# Patient Record
Sex: FEMALE | Race: BLACK OR AFRICAN AMERICAN | NOT HISPANIC OR LATINO | Employment: PART TIME | URBAN - METROPOLITAN AREA
[De-identification: names, ages, dates, MRNs, and addresses within clinical notes are randomized per-mention and may not be internally consistent; named-entity substitution may affect disease eponyms.]

---

## 2017-02-02 ENCOUNTER — TELEPHONE (OUTPATIENT)
Dept: NEUROLOGY | Facility: HOSPITAL | Age: 37
End: 2017-02-02

## 2017-02-02 NOTE — TELEPHONE ENCOUNTER
----- Message from Kasia Gomez sent at 2/2/2017 11:30 AM CST -----  Contact: NEERU  SIENA Krause with [facility] called in regards to patient  needing  a prescription for percocet. Nelida's call back number is [phone].

## 2017-02-06 ENCOUNTER — TELEPHONE (OUTPATIENT)
Dept: NEUROLOGY | Facility: HOSPITAL | Age: 37
End: 2017-02-06

## 2017-02-06 NOTE — TELEPHONE ENCOUNTER
----- Message from Juliette Almanzar sent at 2/6/2017 11:26 AM CST -----  Contact: Patient  KARTHIKEYAN- patient called regarding her that her stitches were discharging. Patient can be reached at [phone]

## 2017-02-06 NOTE — TELEPHONE ENCOUNTER
Returned pts call. Pt states she had hernia repair at [facility] last Saturday. Pt states that she thinks she layed on her stitches last night and she woke up and she noticed blood and clear fluid. States bleeding has stopped, denies redness, swelling drainage. Advised pt to keep an eye on wound and if any of the above develop to call back and proceed to ER. Pt verbalizes understanding. Hospital follow up appt next Monday.

## 2017-02-10 ENCOUNTER — TELEPHONE (OUTPATIENT)
Dept: NEUROLOGY | Facility: HOSPITAL | Age: 37
End: 2017-02-10

## 2017-02-10 NOTE — TELEPHONE ENCOUNTER
----- Message from Kasia Gomez sent at 2/10/2017  8:56 AM CST -----  Contact: patient  KARTHIKEYAN- patients mother, Jeanna Goodson, called in regards to Family Leave paperwork. Jeanna's office, [employer] has not received the paperwork back. Jeanna can be reached at [phone].

## 2017-02-10 NOTE — TELEPHONE ENCOUNTER
Returned pts mothers call to inform that since pt had surgery at [facility], I do not have access to their records, and some additional information is needed in order to fill out [facility] paperwork. Mother did not answer, LVM to return call to give additional information. Awaiting call back.

## 2017-02-13 ENCOUNTER — OFFICE VISIT (OUTPATIENT)
Dept: NEUROLOGY | Facility: HOSPITAL | Age: 37
End: 2017-02-13
Attending: SURGERY
Payer: COMMERCIAL

## 2017-02-13 VITALS
HEART RATE: 75 BPM | SYSTOLIC BLOOD PRESSURE: 108 MMHG | WEIGHT: 293 LBS | HEIGHT: 61 IN | BODY MASS INDEX: 55.32 KG/M2 | DIASTOLIC BLOOD PRESSURE: 73 MMHG | TEMPERATURE: 99 F

## 2017-02-13 DIAGNOSIS — Z09 POSTOP CHECK: Primary | ICD-10-CM

## 2017-02-13 PROCEDURE — 99214 OFFICE O/P EST MOD 30 MIN: CPT | Performed by: SURGERY

## 2017-02-13 RX ORDER — CEPHALEXIN 500 MG/1
CAPSULE ORAL
Refills: 0 | COMMUNITY
Start: 2017-02-07 | End: 2017-02-23

## 2017-02-13 RX ORDER — SULFAMETHOXAZOLE AND TRIMETHOPRIM 800; 160 MG/1; MG/1
1 TABLET ORAL 2 TIMES DAILY
Refills: 0 | COMMUNITY
Start: 2017-02-07 | End: 2017-02-23

## 2017-02-13 RX ORDER — OXYCODONE AND ACETAMINOPHEN 5; 325 MG/1; MG/1
TABLET ORAL
Refills: 0 | COMMUNITY
Start: 2017-02-02 | End: 2017-02-13

## 2017-02-13 RX ORDER — HYDROCODONE BITARTRATE AND ACETAMINOPHEN 5; 325 MG/1; MG/1
TABLET ORAL
Refills: 0 | COMMUNITY
Start: 2017-02-07 | End: 2017-05-01

## 2017-02-13 RX ORDER — ALBUTEROL SULFATE 90 UG/1
2 POWDER, METERED RESPIRATORY (INHALATION) EVERY 4 HOURS PRN
Refills: 1 | COMMUNITY
Start: 2017-02-02 | End: 2017-05-01

## 2017-02-13 NOTE — LETTER
February 13, 2017      [facility] [address] [place] [address]  Phone: [phone]  Fax: [phone]       Patient: Vanita Morillo   YOB: 1980  Date of Visit: 02/13/2017    To Whom It May Concern:    Vanita was at [facility] on 02/13/2017. She may return to work/school on 3/13/2017  with no restrictions. If you have any questions or concerns, or if I can be of further assistance, please do not hesitate to contact me.    Sincerely,        Hoda Bell LPN

## 2017-02-15 ENCOUNTER — TELEPHONE (OUTPATIENT)
Dept: NEUROLOGY | Facility: HOSPITAL | Age: 37
End: 2017-02-15

## 2017-02-15 NOTE — TELEPHONE ENCOUNTER
----- Message from Lida Arenas sent at 2/15/2017 12:17 PM CST -----  KARTHIKEYAN- Patient called and stated if you can get the papers for her work and fax it to [phone] with her discharge notes. If any questions please call patient back at [phone]

## 2017-02-15 NOTE — TELEPHONE ENCOUNTER
Returned pts call. Informed pt that I have faxed leave paperwork along with office note to [facility], but I did not have access to discharge summary due to surgery being performed at . Advised pt to call medical records at [facility]. Pt verbalizes understanding.

## 2017-02-21 ENCOUNTER — TELEPHONE (OUTPATIENT)
Dept: NEUROLOGY | Facility: HOSPITAL | Age: 37
End: 2017-02-21

## 2017-02-21 NOTE — TELEPHONE ENCOUNTER
----- Message from Kasia Gomez sent at 2/21/2017  9:12 AM CST -----  Contact: Patient  KARTHIKEYAN- Patient called because she is concerned that her surgery site is a little larger and draining. Patient also states she finished all of her antibiotics. Patient would like a call back at [phone].

## 2017-02-21 NOTE — TELEPHONE ENCOUNTER
Returned pts call. Pt states that she had a little hole from where staples came out and it has gotten bigger. Right below belly button, size of a irma. Copious drainage with a foul odor, yellow/red in color. Finished with antibiotics. Discussed with Dr. Irwin, who will see pt in clinic on Thusday. Pt verbalizes understanding.

## 2017-02-23 ENCOUNTER — OFFICE VISIT (OUTPATIENT)
Dept: NEUROLOGY | Facility: HOSPITAL | Age: 37
End: 2017-02-23
Attending: SURGERY
Payer: COMMERCIAL

## 2017-02-23 VITALS
WEIGHT: 293 LBS | DIASTOLIC BLOOD PRESSURE: 79 MMHG | SYSTOLIC BLOOD PRESSURE: 118 MMHG | TEMPERATURE: 99 F | HEIGHT: 61 IN | BODY MASS INDEX: 55.32 KG/M2 | HEART RATE: 83 BPM

## 2017-02-23 DIAGNOSIS — Z09 POSTOP CHECK: Primary | ICD-10-CM

## 2017-02-23 PROCEDURE — 99213 OFFICE O/P EST LOW 20 MIN: CPT | Performed by: SURGERY

## 2017-02-23 RX ORDER — ACETAZOLAMIDE 500 MG/1
500 CAPSULE, EXTENDED RELEASE ORAL 2 TIMES DAILY
COMMUNITY
End: 2017-05-01

## 2017-02-23 NOTE — MR AVS SNAPSHOT
[facility]  [address] [place] [address]  Phone: [phone]  Fax: [phone]                  Vanita Morillo   2017 11:00 AM   Office Visit    Description:  Female : 1980   Provider:  Catalina Irwin MD   Department:  [facility]           Reason for Visit     Post-op Evaluation           Diagnoses this Visit        Comments    Postop check    -  Primary            To Do List           Future Appointments        Provider Department Dept Phone    3/6/2017 9:45 AM Catalina Irwin MD [facility] [phone]      Goals (5 Years of Data)     None      Follow-Up and Disposition     Return in about 2 weeks (around 3/9/2017).    Follow-up and Disposition History      [facility]     [facility] Nurse Care Line -  Assistance  Registered nurses in the [facility] provide clinical advisement, health education, appointment booking, and other advisory services.  Call for this free service at [phone].             Medications           Message regarding Medications     Verify the changes and/or additions to your medication regime listed below are the same as discussed with your clinician today.  If any of these changes or additions are incorrect, please notify your healthcare provider.        STOP taking these medications     cephALEXin (KEFLEX) 500 MG capsule TAKE 1 CAPSULE BY MOUTH 4 TIMES A DAY FOR 10 DAYS    sulfamethoxazole-trimethoprim 800-160mg (BACTRIM DS) 800-160 mg Tab Take 1 tablet by mouth 2 (two) times daily.           Verify that the below list of medications is an accurate representation of the medications you are currently taking.  If none reported, the list may be blank. If incorrect, please contact your healthcare provider. Carry this list with you in case of emergency.           Current Medications     acetaZOLAMIDE (DIAMOX) 500 mg CpSR Take 500 mg by mouth 2 (two) times daily.     "hydrocodone-acetaminophen 5-325mg (NORCO) 5-325 mg per tablet TAKE 1 TABLET BY MOUTH EVERY 6 HOURS FOR 5 DAYS AS NEEDED FOR PAIN    PROAIR RESPICLICK 90 mcg/actuation AePB Inhale 2 puffs into the lungs every 4 (four) hours as needed.           Clinical Reference Information           Your Vitals Were     BP Pulse Temp Height Weight BMI    118/79 83 99.3 °F (37.4 °C) (Oral) 5' 1" (1.549 m) 140.2 kg (309 lb) 58.39 kg/m2      Blood Pressure          Most Recent Value    BP  118/79      Allergies as of 2/23/2017     No Known Allergies      Immunizations Administered on Date of Encounter - 2/23/2017     None      [facility] Sign-Up     Activating your [facility] account is as easy as 1-2-3!     1) Visit [email or web address], select Sign Up Now, enter this activation code and your date of birth, then select Next.  [email or web address]  Expires: 3/30/2017 10:56 AM      2) Create a username and password to use when you visit [facility] in the future and select a security question in case you lose your password and select Next.    3) Enter your e-mail address and click Sign Up!    Additional Information  If you have questions, please e-mail [email or web address] or call [phone] to talk to our [facility] staff. Remember, [facility] is NOT to be used for urgent needs. For medical emergencies, dial 911.         Instructions    Keep appointment in March   RX given for Norco        Language Assistance Services     ATTENTION: Language assistance services are available, free of charge. Please call [phone].      ATENCIÓN: Si habla español, tiene a amin disposición servicios gratuitos de asistencia lingüística. Llame al [phone].     CHÚ Ý: N?u b?n nói Ti?ng Vi?t, có các d?ch v? h? tr? ngôn ng? mi?n phí dành cho b?n. G?i s? [phone].         [facility] complies with applicable Federal civil rights laws and does not discriminate on the basis of race, color, national origin, age, disability, or sex.        "

## 2017-02-23 NOTE — PROGRESS NOTES
S/p incisional hernia  Incarcerated repair    Doing okj  Still with dc from wound      Eating , normal BM    abd soft    Wound looks good   Minimal dc from wound    Continue present care    Will see hert in 2 weeks

## 2017-03-06 ENCOUNTER — LAB VISIT (OUTPATIENT)
Dept: LAB | Facility: HOSPITAL | Age: 37
End: 2017-03-06
Attending: SURGERY
Payer: COMMERCIAL

## 2017-03-06 ENCOUNTER — OFFICE VISIT (OUTPATIENT)
Dept: NEUROLOGY | Facility: HOSPITAL | Age: 37
End: 2017-03-06
Attending: SURGERY
Payer: COMMERCIAL

## 2017-03-06 VITALS
TEMPERATURE: 100 F | SYSTOLIC BLOOD PRESSURE: 112 MMHG | HEIGHT: 61 IN | RESPIRATION RATE: 18 BRPM | DIASTOLIC BLOOD PRESSURE: 76 MMHG | BODY MASS INDEX: 55.32 KG/M2 | WEIGHT: 293 LBS | HEART RATE: 76 BPM

## 2017-03-06 DIAGNOSIS — R30.0 DYSURIA: ICD-10-CM

## 2017-03-06 DIAGNOSIS — Z09 POSTOP CHECK: Primary | ICD-10-CM

## 2017-03-06 DIAGNOSIS — R30.0 DYSURIA: Primary | ICD-10-CM

## 2017-03-06 LAB
BACTERIA #/AREA URNS HPF: NORMAL /HPF
BILIRUB UR QL STRIP: NEGATIVE
CLARITY UR: ABNORMAL
COLOR UR: YELLOW
GLUCOSE UR QL STRIP: NEGATIVE
HGB UR QL STRIP: ABNORMAL
KETONES UR QL STRIP: NEGATIVE
LEUKOCYTE ESTERASE UR QL STRIP: ABNORMAL
MICROSCOPIC COMMENT: NORMAL
NITRITE UR QL STRIP: NEGATIVE
PH UR STRIP: 6 [PH] (ref 5–8)
PROT UR QL STRIP: NEGATIVE
RBC #/AREA URNS HPF: 3 /HPF (ref 0–4)
SP GR UR STRIP: 1.02 (ref 1–1.03)
SQUAMOUS #/AREA URNS HPF: 15 /HPF
URN SPEC COLLECT METH UR: ABNORMAL
UROBILINOGEN UR STRIP-ACNC: NEGATIVE EU/DL
WBC #/AREA URNS HPF: 2 /HPF (ref 0–5)

## 2017-03-06 PROCEDURE — 99213 OFFICE O/P EST LOW 20 MIN: CPT | Performed by: SURGERY

## 2017-03-06 PROCEDURE — 81000 URINALYSIS NONAUTO W/SCOPE: CPT

## 2017-03-06 PROCEDURE — 87086 URINE CULTURE/COLONY COUNT: CPT

## 2017-03-06 NOTE — PATIENT INSTRUCTIONS
Return to clinic in 8 weeks   Amber to call to consult   We will contact you to schedule EGD with Dr. Doty   Go to 1st floor outpatient diagnostic center to get urinalysis

## 2017-03-06 NOTE — MR AVS SNAPSHOT
[facility]  [address] [place] [address]  Phone: [phone]  Fax: [phone]                  Vanita Morillo   3/6/2017 9:45 AM   Office Visit    Description:  Female : 1980   Provider:  Catalina Irwin MD   Department:  [facility]           Reason for Visit     Hospital Follow Up           Diagnoses this Visit        Comments    Postop check    -  Primary            To Do List           Future Appointments        Provider Department Dept Phone    2017 8:30 AM Catalina Irwin MD [facility] [phone]      Goals (5 Years of Data)     None      Follow-Up and Disposition     Return in about 8 weeks (around 2017).    Follow-up and Disposition History      [facility]     [facility] Nurse Care Line -  Assistance  Registered nurses in the [facility] provide clinical advisement, health education, appointment booking, and other advisory services.  Call for this free service at [phone].             Medications           Message regarding Medications     Verify the changes and/or additions to your medication regime listed below are the same as discussed with your clinician today.  If any of these changes or additions are incorrect, please notify your healthcare provider.             Verify that the below list of medications is an accurate representation of the medications you are currently taking.  If none reported, the list may be blank. If incorrect, please contact your healthcare provider. Carry this list with you in case of emergency.           Current Medications     acetaZOLAMIDE (DIAMOX) 500 mg CpSR Take 500 mg by mouth 2 (two) times daily.    hydrocodone-acetaminophen 5-325mg (NORCO) 5-325 mg per tablet TAKE 1 TABLET BY MOUTH EVERY 6 HOURS FOR 5 DAYS AS NEEDED FOR PAIN    PROAIR RESPICLICK 90 mcg/actuation AePB Inhale 2 puffs into the lungs every 4 (four) hours as needed.          "  Clinical Reference Information           Your Vitals Were     BP Pulse Temp Resp Height Weight    112/76 76 100 °F (37.8 °C) 18 5' 1" (1.549 m) 139.6 kg (307 lb 12.8 oz)    Last Period BMI             03/02/2017 58.16 kg/m2         Blood Pressure          Most Recent Value    BP  112/76      Allergies as of 3/6/2017     No Known Allergies      Immunizations Administered on Date of Encounter - 3/6/2017     None      Orders Placed During Today's Visit      Normal Orders This Visit    CULTURE, URINE       [facility] Sign-Up     Activating your [facility] account is as easy as 1-2-3!     1) Visit [email or web address], select Sign Up Now, enter this activation code and your date of birth, then select Next.  [email or web address]  Expires: 3/30/2017 10:56 AM      2) Create a username and password to use when you visit [facility] in the future and select a security question in case you lose your password and select Next.    3) Enter your e-mail address and click Sign Up!    Additional Information  If you have questions, please e-mail [email or web address] or call [phone] to talk to our [facility] staff. Remember, [facility] is NOT to be used for urgent needs. For medical emergencies, dial 911.         Instructions    Return to clinic in 8 weeks   [facility] to call to consult   We will contact you to schedule EGD with Dr. Doty   Go to 1st floor outpatient diagnostic center to get urinalysis             Language Assistance Services     ATTENTION: Language assistance services are available, free of charge. Please call [phone].      ATENCIÓN: Si habla español, tiene a amin disposición servicios gratuitos de asistencia lingüística. Llame al [phone].     CHÚ Ý: N?u b?n nói Ti?ng Vi?t, có các d?ch v? h? tr? ngôn ng? mi?n phí dành cho b?n. G?i s? [phone].         [facility] complies with applicable Federal civil rights laws and does not discriminate on the basis of race, color, national origin, age, " disability, or sex.

## 2017-03-06 NOTE — LETTER
March 6, 2017      [facility] [address] [place] [address]  Phone: [phone]  Fax: [phone]       Patient: Vanita Morillo   YOB: 1980  Date of Visit: 03/06/2017    To Whom It May Concern:    Vanita was at [facility] on 3/6/2017. She may return to work/school on 3/20/2017 with no restrictions. If you have any questions or concerns, or if I can be of further assistance, please do not hesitate to contact me.    Sincerely,        Hoda Bell LPN

## 2017-03-06 NOTE — PROGRESS NOTES
S/p incisional hernia    Doing well          Wound looks good minimnal discharge heal;ing well    Continue present care    dietitician to talk to her    If temp elevated will call tjhis place    Will check urine culture    Had seen ophthalmologist, may need surgery    F/u 8 weeks    Will arrange EGD

## 2017-03-08 LAB
BACTERIA UR CULT: NORMAL
BACTERIA UR CULT: NORMAL

## 2017-03-09 ENCOUNTER — TELEPHONE (OUTPATIENT)
Dept: NEUROLOGY | Facility: HOSPITAL | Age: 37
End: 2017-03-09

## 2017-03-09 DIAGNOSIS — K21.9 GASTROESOPHAGEAL REFLUX DISEASE, ESOPHAGITIS PRESENCE NOT SPECIFIED: Primary | ICD-10-CM

## 2017-03-09 NOTE — TELEPHONE ENCOUNTER
You are scheduled for an EGD on 4/6/2017    You should eat light meals the day before the procedure and nothing to eat or drink after midnight the night before your procedure.    You will need to be at the 1st floor admission desk at the hospital at the time given to you by the endoscopy department. They will call to give you an arrival time the day before the procedure.

## 2017-03-09 NOTE — TELEPHONE ENCOUNTER
Called pt to schedule EGD 4/6/2017. Verbally reviewed instructions. Will also mail a copy to home address. Pt verbalizes understanding.

## 2017-03-14 ENCOUNTER — TELEPHONE (OUTPATIENT)
Dept: NEUROLOGY | Facility: HOSPITAL | Age: 37
End: 2017-03-14

## 2017-03-14 NOTE — TELEPHONE ENCOUNTER
----- Message from Helen Jones sent at 3/13/2017  4:18 PM CDT -----  Try this number [phone].  Also, see is she even needs authorization for an outpatient surgery.  ----- Message -----     From: Juliette Almanzar     Sent: 3/13/2017   4:07 PM       To: Helen Jones    Bev, I am having problems getting authorization for this procedure. The insurance prompts will not lead me to a human...and the procedure is not listed..the closet procedure they have is a colonoscopy...Help ...  Juliette   ----- Message -----     From: Hoda Bell LPN     Sent: 3/9/2017  10:41 AM       To: Juliette Segovia,   Can we get auth for this pt for 4/6?   DX: K21.9  CPT: 52375    Thanks!  Hoda

## 2017-04-06 ENCOUNTER — ANESTHESIA EVENT (OUTPATIENT)
Dept: ENDOSCOPY | Facility: HOSPITAL | Age: 37
End: 2017-04-06
Payer: COMMERCIAL

## 2017-04-06 ENCOUNTER — SURGERY (OUTPATIENT)
Age: 37
End: 2017-04-06

## 2017-04-06 ENCOUNTER — HOSPITAL ENCOUNTER (OUTPATIENT)
Facility: HOSPITAL | Age: 37
Discharge: HOME | End: 2017-04-06
Attending: INTERNAL MEDICINE | Admitting: INTERNAL MEDICINE
Payer: COMMERCIAL

## 2017-04-06 ENCOUNTER — ANESTHESIA (OUTPATIENT)
Dept: ENDOSCOPY | Facility: HOSPITAL | Age: 37
End: 2017-04-06
Payer: COMMERCIAL

## 2017-04-06 DIAGNOSIS — K29.70 GASTRITIS, PRESENCE OF BLEEDING UNSPECIFIED, UNSPECIFIED CHRONICITY, UNSPECIFIED GASTRITIS TYPE: ICD-10-CM

## 2017-04-06 DIAGNOSIS — R10.9 ABDOMINAL PAIN, UNSPECIFIED LOCATION: Primary | ICD-10-CM

## 2017-04-06 DIAGNOSIS — R10.9 ABDOMINAL PAIN: ICD-10-CM

## 2017-04-06 LAB
B-HCG UR QL: NEGATIVE
CTP QC/QA: YES

## 2017-04-06 PROCEDURE — 81025 URINE PREGNANCY TEST: CPT | Performed by: INTERNAL MEDICINE

## 2017-04-06 PROCEDURE — 43239 EGD BIOPSY SINGLE/MULTIPLE: CPT | Performed by: INTERNAL MEDICINE

## 2017-04-06 PROCEDURE — 37000009 HC ANESTHESIA EA ADD 15 MINS: Performed by: INTERNAL MEDICINE

## 2017-04-06 PROCEDURE — 88305 TISSUE EXAM BY PATHOLOGIST: CPT | Performed by: PATHOLOGY

## 2017-04-06 PROCEDURE — 88305 TISSUE EXAM BY PATHOLOGIST: CPT | Mod: 26,,, | Performed by: PATHOLOGY

## 2017-04-06 PROCEDURE — 25000003 PHARM REV CODE 250: Performed by: NURSE ANESTHETIST, CERTIFIED REGISTERED

## 2017-04-06 PROCEDURE — 25000003 PHARM REV CODE 250: Performed by: INTERNAL MEDICINE

## 2017-04-06 PROCEDURE — 63600175 PHARM REV CODE 636 W HCPCS: Performed by: NURSE ANESTHETIST, CERTIFIED REGISTERED

## 2017-04-06 PROCEDURE — 37000008 HC ANESTHESIA 1ST 15 MINUTES: Performed by: INTERNAL MEDICINE

## 2017-04-06 RX ORDER — PROPOFOL 10 MG/ML
VIAL (ML) INTRAVENOUS CONTINUOUS PRN
Status: DISCONTINUED | OUTPATIENT
Start: 2017-04-06 | End: 2017-04-06

## 2017-04-06 RX ORDER — PROPOFOL 10 MG/ML
VIAL (ML) INTRAVENOUS
Status: DISCONTINUED | OUTPATIENT
Start: 2017-04-06 | End: 2017-04-06

## 2017-04-06 RX ORDER — SODIUM CHLORIDE 9 MG/ML
INJECTION, SOLUTION INTRAVENOUS CONTINUOUS
Status: DISCONTINUED | OUTPATIENT
Start: 2017-04-06 | End: 2017-04-06 | Stop reason: HOSPADM

## 2017-04-06 RX ORDER — LIDOCAINE HCL/PF 100 MG/5ML
SYRINGE (ML) INTRAVENOUS
Status: DISCONTINUED | OUTPATIENT
Start: 2017-04-06 | End: 2017-04-06

## 2017-04-06 RX ADMIN — SODIUM CHLORIDE: 0.9 INJECTION, SOLUTION INTRAVENOUS at 11:04

## 2017-04-06 RX ADMIN — PROPOFOL 50 MG: 10 INJECTION, EMULSION INTRAVENOUS at 12:04

## 2017-04-06 RX ADMIN — LIDOCAINE HYDROCHLORIDE 100 MG: 20 INJECTION, SOLUTION INTRAVENOUS at 12:04

## 2017-04-06 RX ADMIN — TOPICAL ANESTHETIC 1 EACH: 200 SPRAY DENTAL; PERIODONTAL at 12:04

## 2017-04-06 RX ADMIN — PROPOFOL 150 MCG/KG/MIN: 10 INJECTION, EMULSION INTRAVENOUS at 12:04

## 2017-04-06 NOTE — ANESTHESIA PREPROCEDURE EVALUATION
04/06/2017  Vanita Morillo is a 37 y.o., female, morbidly obese, with gerd here for EGD for esophagitis.    [facility] Anesthesia Evaluation    I have reviewed the Patient Summary Reports.     I have reviewed the Medications.     Review of Systems  Anesthesia Hx:  History of prior surgery of interest to airway management or planning:  Denies Personal Hx of Anesthesia complications.   Social:  Non-Smoker, No Alcohol Use    Cardiovascular:  Cardiovascular Normal     Hepatic/GI:   GERD, poorly controlled    Neurological:  Neurology Normal    Endocrine:  Endocrine Normal        Physical Exam  General:  Well nourished, Morbid Obesity    Airway/Jaw/Neck:  Airway Findings: Mouth Opening: Normal Tongue: Normal  General Airway Assessment: Adult  Mallampati: II  TM Distance: Normal, at least 6 cm  Jaw/Neck Findings:     Neck ROM: Normal ROM      Dental:  Dental Findings: In tact   Chest/Lungs:  Chest/Lungs Clear    Heart/Vascular:  Heart Findings: Normal       Mental Status:  Mental Status Findings:  Cooperative, Alert and Oriented         Anesthesia Plan  Type of Anesthesia, risks & benefits discussed:  Anesthesia Type:  MAC  Patient's Preference: MAC  Intra-op Monitoring Plan:   Intra-op Monitoring Plan Comments:   Post Op Pain Control Plan:   Post Op Pain Control Plan Comments:   Induction:   IV  Beta Blocker:         Informed Consent: Patient understands risks and agrees with Anesthesia plan.  Questions answered. Anesthesia consent signed with patient.  ASA Score: 3     Day of Surgery Review of History & Physical:    H&P update referred to the provider.         Ready For Surgery From Anesthesia Perspective.

## 2017-04-06 NOTE — IP AVS SNAPSHOT
[facility]  [address]  [place] [address]  Phone: [phone]           Patient Discharge Instructions   Our goal is to set you up for success. This packet includes information on your condition, medications, and your home care.  It will help you care for yourself to prevent having to return to the hospital.     Please ask your nurse if you have any questions.      There are many details to remember when preparing to leave the hospital. Here is what you will need to do:    1. Take your medicine. If you are prescribed medications, review your Medication List on the following pages. You may have new medications to  at the pharmacy and others that you'll need to stop taking. Review the instructions for how and when to take your medications. Talk with your doctor or nurses if you are unsure of what to do.     2. Go to your follow-up appointments. Specific follow-up information is listed in the following pages. Your may be contacted by a nurse or clinical provider about future appointments. Be sure we have all of the phone numbers to reach you. Please contact your provider's office if you are unable to make an appointment.     3. Watch for warning signs. Your doctor or nurse will give you detailed warning signs to watch for and when to call for assistance. These instructions may also include educational information about your condition. If you experience any of warning signs to your health, call your doctor.               ** Verify the list of medication(s) below is accurate and up to date. Carry this with you in case of emergency. If your medications have changed, please notify your healthcare provider.             Medication List      CONTINUE taking these medications        Additional Info                      acetaZOLAMIDE 500 mg Cpsr   Commonly known as:  DIAMOX   Refills:  0   Dose:  500 mg    Instructions:  Take 500 mg by mouth 2 (two) times daily.     Begin Date    AM    Noon    PM     Bedtime       hydrocodone-acetaminophen 5-325mg 5-325 mg per tablet   Commonly known as:  NORCO   Refills:  0    Instructions:  TAKE 1 TABLET BY MOUTH EVERY 6 HOURS FOR 5 DAYS AS NEEDED FOR PAIN     Begin Date    AM    Noon    PM    Bedtime       PROAIR RESPICLICK 90 mcg/actuation Aepb   Refills:  1   Dose:  2 puff   Generic drug:  albuterol sulfate    Instructions:  Inhale 2 puffs into the lungs every 4 (four) hours as needed.     Begin Date    AM    Noon    PM    Bedtime                  Please bring to all follow up appointments:    1. A copy of your discharge instructions.  2. All medicines you are currently taking in their original bottles.  3. Identification and insurance card.    Please arrive 15 minutes ahead of scheduled appointment time.    Please call 24 hours in advance if you must reschedule your appointment and/or time.        Your Scheduled Appointments     May 01, 2017  8:30 AM CDT   Established Patient Visit with Catalina Irwin MD   [facility] ([facility])    [address]  [place] [address]   [phone]              Your Future Surgeries/Procedures     Apr 06, 2017   Surgery with Oskar Doty MD   [facility] ([facility])    [address]  [place] [address]   [phone]                Discharge Instructions     Future Orders    Diet general     Questions:    Total calories:      Fat restriction, if any:      Protein restriction, if any:      Na restriction, if any:      Fluid restriction:      Additional restrictions:          Discharge Instructions       Post EGD Discharge Instruction    Vanita Morillo  4/6/2017  Oskar Doty MD    RESTRICTIONS ON ACTIVITY:    -DO NOT drive a car or operate machinery until the day after procedure.  -Following Day: Return to full activities including work.  -Diet: Eat and drink normally unless instructed otherwise.    TREATMENT FOR COMMON SIDE EFFECTS:  *Sore  "Throat - treat with throat lozenges, gargle with warm salt water.  *Mild abdominal pain & bloating- rest and take liquids only.    SYMPTOMS TO WATCH FOR AND REPORT TO YOUR PHYSICIAN:  1. Chills or fever occurring 24 hours after procedure.  2. Pain in chest.  3. SEVERE abdominal pain or bloating.  4. Rectal bleeding which could be maroon or black.    If you have any questions or problems, please call your Physician:    Oskar Doty MD Phone: ***    Lab Results: [phone]    If a complication or emergency situation arises and you are unable to reach your Physician - GO TO THE EMERGENCY ROOM.        Admission Information     Date & Time Provider Department CSN    4/6/2017 10:49 AM Oskar Doty MD [facility] [id number]      Care Providers     Provider Role Specialty Primary office phone    Oskar oDty MD Attending Provider Gastroenterology [phone]    Oskar Doty MD Surgeon  Gastroenterology [phone]      Your Vitals Were     BP Pulse Temp Resp Height Weight    118/79 57 98.2 °F (36.8 °C) 16 5' 1" (1.549 m) 129.3 kg (285 lb)    Last Period SpO2 BMI          03/06/2017 100% 53.85 kg/m2        Recent Lab Values     No lab values to display.      Pending Labs     Order Current Status    Specimen to Pathology - Surgery Collected (04/06/17 1242)      Allergies as of 4/6/2017     No Known Allergies      [facility]     [facility] Nurse Care Line - 24/7 Assistance  Unless otherwise directed by your provider, please contact [facility], our nurse care line that is available for 24/7 assistance.     Registered nurses in the [facility] provide clinical advisement, health education, appointment booking, and other advisory services.  Call for this free service at [phone].        Advance Directives     An advance directive is a document which, in the event you are no longer able to make decisions for yourself, tells your healthcare team what kind of " treatment you do or do not want to receive, or who you would like to make those decisions for you.  If you do not currently have an advance directive, [facility] encourages you to create one.  For more information call:  [phone]), [phone] ([phone]),  or log on to [email or web address].        Language Assistance Services     ATTENTION: Language assistance services are available, free of charge. Please call [phone].      ATENCIÓN: Si habla espjaz, tiene a amin disposición servicios gratuitos de asistencia lingüística. Llame al [phone].     CHÚ Ý: N?u b?n nói Ti?ng Vi?t, có các d?ch v? h? tr? ngôn ng? mi?n phí dành cho b?n. G?i s? [phone].        [facility] Sign-Up     Activating your [facility] account is as easy as 1-2-3!     1) Visit [email or web address], select Sign Up Now, enter this activation code and your date of birth, then select Next.  [email or web address]  Expires: 5/21/2017  1:19 PM      2) Create a username and password to use when you visit [facility] in the future and select a security question in case you lose your password and select Next.    3) Enter your e-mail address and click Sign Up!    Additional Information  If you have questions, please e-mail [email or web address] or call [phone] to talk to our [facility] staff. Remember, [facility] is NOT to be used for urgent needs. For medical emergencies, dial 911.          [facility] complies with applicable Federal civil rights laws and does not discriminate on the basis of race, color, national origin, age, disability, or sex.

## 2017-04-06 NOTE — DISCHARGE INSTRUCTIONS
Post EGD Discharge Instruction    Vanita Morillo  4/6/2017  Oskar Doty MD    RESTRICTIONS ON ACTIVITY:    -DO NOT drive a car or operate machinery until the day after procedure.  -Following Day: Return to full activities including work.  -Diet: Eat and drink normally unless instructed otherwise.    TREATMENT FOR COMMON SIDE EFFECTS:  *Sore Throat - treat with throat lozenges, gargle with warm salt water.  *Mild abdominal pain & bloating- rest and take liquids only.    SYMPTOMS TO WATCH FOR AND REPORT TO YOUR PHYSICIAN:  1. Chills or fever occurring 24 hours after procedure.  2. Pain in chest.  3. SEVERE abdominal pain or bloating.  4. Rectal bleeding which could be maroon or black.    If you have any questions or problems, please call your Physician:    Oskar Doty MD Phone: ***    Lab Results: [phone]    If a complication or emergency situation arises and you are unable to reach your Physician - GO TO THE EMERGENCY ROOM.

## 2017-04-06 NOTE — OR NURSING
Reviewed discharge education with the pt. Pt was able to verbalize an understanding of the the education provided. Peripheral iv was removed with the tip intact. Insertion site covered with gauze and coban.  Pt waiting on transportation to main lobby.

## 2017-04-06 NOTE — ANESTHESIA POSTPROCEDURE EVALUATION
"Anesthesia Post Evaluation    Patient: Vanita Morillo    Procedure(s) Performed: Procedure(s) (LRB):  ESOPHAGOGASTRODUODENOSCOPY (EGD) (N/A)    Final Anesthesia Type: MAC  Patient location during evaluation: GI PACU  Patient participation: Yes- Able to Participate  Level of consciousness: awake and alert and oriented  Post-procedure vital signs: reviewed and stable  Pain management: adequate  Airway patency: patent  PONV status at discharge: No PONV  Anesthetic complications: no      Cardiovascular status: blood pressure returned to baseline and hemodynamically stable  Respiratory status: unassisted  Hydration status: euvolemic  Follow-up not needed.        Visit Vitals    /66    Pulse 72    Temp 36.9 °C (98.5 °F)    Resp 20    Ht 5' 1" (1.549 m)    Wt 129.3 kg (285 lb)    LMP 03/06/2017    SpO2 96%    Breastfeeding No    BMI 53.85 kg/m2       Pain/Kallie Score: Pain Assessment Performed: Yes (4/6/2017 11:47 AM)  Presence of Pain: denies (4/6/2017 11:47 AM)      "

## 2017-04-06 NOTE — H&P
[facility]    CC: abdominal pain    HPI 37 y.o. female with recent hospitalization for incarcerated hernia with repair when she presented with severe abdominal pain, nausea and vomiting without melena and hematochezia.        Past Medical History  Hernia repair    Review of Systems  General ROS: negative for chills, fever or weight loss  Cardiovascular ROS: no chest pain or dyspnea on exertion  Gastrointestinal ROS: no change in bowel habits, or black/ bloody stools.  Positive for abdominal pain    Physical Examination  There were no vitals taken for this visit.  General appearance: alert, cooperative, no distress  HENT: Normocephalic, atraumatic, neck symmetrical, no nasal discharge   Lungs: clear to auscultation bilaterally, no dullness to percussion bilaterally  Heart: regular rate and rhythm without rub; no displacement of the PMI   Abdomen: soft, non-tender; bowel sounds normoactive; no organomegaly  Extremities: extremities symmetric; no clubbing, cyanosis, or edema  Neurologic: Alert and oriented X 3, normal strength, normal coordination and gait    Labs:  UA negative for protein        Assessment: The patient is a 38 yo female with recent incarcerated hernia with repair who presents today to further evaluate for a source of her abdominal discomfort.    Plan:  Proceed to EGD.    Oskar Doty MD   [address], [address]   [place] [address] [phone]

## 2017-04-06 NOTE — TRANSFER OF CARE
"Anesthesia Transfer of Care Note    Patient: Vanita Morillo    Procedure(s) Performed: Procedure(s) (LRB):  ESOPHAGOGASTRODUODENOSCOPY (EGD) (N/A)    Patient location: GI    Anesthesia Type: MAC    Transport from OR: Transported from OR on room air with adequate spontaneous ventilation    Post pain: adequate analgesia    Post assessment: no apparent anesthetic complications and tolerated procedure well    Post vital signs: stable    Level of consciousness: awake, alert and oriented    Nausea/Vomiting: no nausea/vomiting    Complications: none          Last vitals:   Visit Vitals    /66    Pulse 72    Temp 36.9 °C (98.5 °F)    Resp 20    Ht 5' 1" (1.549 m)    Wt 129.3 kg (285 lb)    LMP 03/06/2017    SpO2 96%    Breastfeeding No    BMI 53.85 kg/m2     "

## 2017-04-07 VITALS
DIASTOLIC BLOOD PRESSURE: 79 MMHG | WEIGHT: 285 LBS | SYSTOLIC BLOOD PRESSURE: 118 MMHG | TEMPERATURE: 98 F | RESPIRATION RATE: 16 BRPM | HEIGHT: 61 IN | HEART RATE: 57 BPM | BODY MASS INDEX: 53.81 KG/M2 | OXYGEN SATURATION: 100 %

## 2017-04-19 ENCOUNTER — TELEPHONE (OUTPATIENT)
Dept: NEUROLOGY | Facility: HOSPITAL | Age: 37
End: 2017-04-19

## 2017-04-19 NOTE — TELEPHONE ENCOUNTER
I called and discussed her pathology results from her EGD, which showed gastritis and were negative for Helicobacter.  She continues to do well without significant GERD or abdominal pain.

## 2017-05-01 ENCOUNTER — OFFICE VISIT (OUTPATIENT)
Dept: NEUROLOGY | Facility: HOSPITAL | Age: 37
End: 2017-05-01
Attending: SURGERY
Payer: COMMERCIAL

## 2017-05-01 VITALS
BODY MASS INDEX: 55.32 KG/M2 | HEIGHT: 61 IN | TEMPERATURE: 99 F | WEIGHT: 293 LBS | HEART RATE: 67 BPM | DIASTOLIC BLOOD PRESSURE: 84 MMHG | SYSTOLIC BLOOD PRESSURE: 127 MMHG

## 2017-05-01 DIAGNOSIS — E66.01 MORBID OBESITY, UNSPECIFIED OBESITY TYPE: ICD-10-CM

## 2017-05-01 DIAGNOSIS — K43.2 INCISIONAL HERNIA, WITHOUT OBSTRUCTION OR GANGRENE: Primary | ICD-10-CM

## 2017-05-01 PROCEDURE — 99214 OFFICE O/P EST MOD 30 MIN: CPT | Performed by: SURGERY

## 2017-05-01 NOTE — LETTER
May 1, 2017      [facility] [address] [place] [address]  Phone: [phone]  Fax: [phone]       Patient: Vanita Morillo   YOB: 1980  Date of Visit: 05/01/2017    To Whom It May Concern:    Vanita Reynoso was at [facility] on 05/01/2017. Her BMI is 59.14. She recently had surgery for an incarcerated hernia and has a great risk for recurrence. To prevent this, pt will need bariatric surgery. Please feel free to contact me with any questions.       Sincerely,        Dr. Dc Arnold.

## 2017-05-01 NOTE — PROGRESS NOTES
"NOLANETS:  [facility]  A collaboration between [facility] and [facility]      PATIENT: Vanita Morillo  MRN: [id number]  DATE: 5/1/2017    Subjective:      Chief Complaint: Follow-up (8 weeks follow up)  feeling lot better  Back to gym  Losing weight    Vitals:   Vitals:    05/01/17 0834   BP: 127/84   Pulse: 67   Temp: 98.8 °F (37.1 °C)   TempSrc: Oral   Weight: (!) 142 kg (313 lb)   Height: 5' 1" (1.549 m)        Karnofsky Score:     Diagnosis: No diagnosis found.     Oncologic History:   Interval History:     Past Medical History:  Past Medical History:   Diagnosis Date    Acid reflux        Past Surgical History:  Past Surgical History:   Procedure Laterality Date    EYE SURGERY      R eye    HERNIA REPAIR      abdominal    UTERINE FIBROID SURGERY         Family History:  No family history on file.    Allergies:  Review of patient's allergies indicates:  No Known Allergies    Medications:  No current outpatient prescriptions on file.     No current facility-administered medications for this visit.        Review of Systems   Objective:      Physical Exam   Constitutional: She appears well-nourished. No distress.   HENT:   Head: Atraumatic.   Eyes: EOM are normal.   Neck: Neck supple.   Cardiovascular: Normal rate and regular rhythm.    Pulmonary/Chest: Effort normal and breath sounds normal.   Abdominal: Soft. Bowel sounds are normal.   Wound completely healed   Musculoskeletal: Normal range of motion.   Neurological: She is alert.      Assessment:       No diagnosis found.    Laboratory Data:       Impression:   Plan:           S/p incisional hernia repair    Doing well  Wound has healed. Had discussuion about addressing obesity as she has high chance of recurrence of hernia  Would definitely benefit from Bariatric procedure        Will f/u in 6 months. Would CT abd at that time to screen for hernia          Dc, " GARCIA STUBBS, FACS   Associate Professor of Surgery, [facility]   Neuroendocrine Surgery, Hepatic/Pancreatic & General Surgery   [address], [address]   [place] [address]   ph. [phone]; [phone]   fax. [phone]

## 2017-05-01 NOTE — PATIENT INSTRUCTIONS
Return to clinic in 6 months     CT prior to follow up appt -- Call [phone] to schedule CT scan

## 2017-05-01 NOTE — MR AVS SNAPSHOT
[facility]  [address] [place] [address]  Phone: [phone]  Fax: [phone]                  Vanita Morillo   2017 8:30 AM   Office Visit    Description:  Female : 1980   Provider:  Dc Arnold MD   Department:  [facility]           Reason for Visit     Follow-up           Diagnoses this Visit        Comments    Incisional hernia, without obstruction or gangrene    -  Primary     Morbid obesity, unspecified obesity type                To Do List           Future Appointments        Provider Department Dept Phone    2017 8:30 AM Dc Arnold MD [facility] [phone]      Goals (5 Years of Data)     None      Follow-Up and Disposition     Return in about 6 months (around 2017).    Follow-up and Disposition History      [facility]     [facility] Care Line -  Assistance  Unless otherwise directed by your provider, please contact [facility], our nurse care line that is available for  assistance.     Registered nurses in the [facility] provide: appointment scheduling, clinical advisement, health education, and other advisory services.  Call: [phone] (toll free)               Medications           Message regarding Medications     Verify the changes and/or additions to your medication regime listed below are the same as discussed with your clinician today.  If any of these changes or additions are incorrect, please notify your healthcare provider.        STOP taking these medications     hydrocodone-acetaminophen 5-325mg (NORCO) 5-325 mg per tablet TAKE 1 TABLET BY MOUTH EVERY 6 HOURS FOR 5 DAYS AS NEEDED FOR PAIN    acetaZOLAMIDE (DIAMOX) 500 mg CpSR Take 500 mg by mouth 2 (two) times daily.    PROAIR RESPICLICK 90 mcg/actuation AePB Inhale 2 puffs into the lungs every 4 (four) hours as needed.           Verify that the below list of medications is an  "accurate representation of the medications you are currently taking.  If none reported, the list may be blank. If incorrect, please contact your healthcare provider. Carry this list with you in case of emergency.           Current Medications            Clinical Reference Information           Your Vitals Were     BP Pulse Temp Height Weight Last Period    127/84 67 98.8 °F (37.1 °C) (Oral) 5' 1" (1.549 m) 142 kg (313 lb) 03/06/2017    BMI                59.14 kg/m2          Blood Pressure          Most Recent Value    BP  127/84      Allergies as of 5/1/2017     No Known Allergies      Immunizations Administered on Date of Encounter - 5/1/2017     None      Orders Placed During Today's Visit     Future Labs/Procedures Expected by Expires    CT Abdomen Pelvis With Contrast  5/1/2017 5/1/2018      [facility] Sign-Up     Activating your [facility] account is as easy as 1-2-3!     1) Visit [email or web address], select Sign Up Now, enter this activation code and your date of birth, then select Next.  [email or web address]  Expires: 5/21/2017  1:19 PM      2) Create a username and password to use when you visit [facility] in the future and select a security question in case you lose your password and select Next.    3) Enter your e-mail address and click Sign Up!    Additional Information  If you have questions, please e-mail [email or web address] or call [phone] to talk to our [facility] staff. Remember, [facility] is NOT to be used for urgent needs. For medical emergencies, dial 911.         Instructions    Return to clinic in 6 months     CT prior to follow up appt -- Call [phone] to schedule CT scan        Language Assistance Services     ATTENTION: Language assistance services are available, free of charge. Please call [phone].      ATENCIÓN: Si habla español, tiene a amin disposición servicios gratuitos de asistencia lingüística. Llame al [phone].     CHÚ Ý: N?u b?n nói Ti?ng Vi?t, có các d?ch v? h? tr? ngôn " ng? mi?n phí dành cho b?n. G?i s? [phone].         [facility] complies with applicable Federal civil rights laws and does not discriminate on the basis of race, color, national origin, age, disability, or sex.

## 2017-11-06 ENCOUNTER — TELEPHONE (OUTPATIENT)
Dept: NEUROLOGY | Facility: HOSPITAL | Age: 37
End: 2017-11-06

## 2017-11-06 DIAGNOSIS — K46.0 INCARCERATED HERNIA: Primary | ICD-10-CM

## 2017-11-06 NOTE — TELEPHONE ENCOUNTER
----- Message from Kasia Gomez sent at 11/6/2017  8:54 AM CST -----  Contact: Patient  KARTHIKEYAN- Patient needs a referral for bariatric surgery for medical reasons mailed to her. Patient can be reached at [phone].

## 2017-11-06 NOTE — TELEPHONE ENCOUNTER
Returned pts call to inform that referral order and letter sent to her home address. Pt verbalizes understanding.